# Patient Record
Sex: FEMALE | Race: WHITE | NOT HISPANIC OR LATINO | Employment: OTHER | URBAN - METROPOLITAN AREA
[De-identification: names, ages, dates, MRNs, and addresses within clinical notes are randomized per-mention and may not be internally consistent; named-entity substitution may affect disease eponyms.]

---

## 2023-06-20 RX ORDER — ATORVASTATIN CALCIUM 20 MG/1
20 TABLET, FILM COATED ORAL DAILY
COMMUNITY
Start: 2020-11-18 | End: 2023-06-23 | Stop reason: SDUPTHER

## 2023-06-20 RX ORDER — HYDROCHLOROTHIAZIDE 25 MG/1
25 TABLET ORAL DAILY
COMMUNITY
Start: 2022-06-17 | End: 2023-06-23 | Stop reason: SDUPTHER

## 2023-06-20 RX ORDER — ENALAPRIL MALEATE 20 MG/1
20 TABLET ORAL DAILY
COMMUNITY
Start: 2022-06-17 | End: 2023-06-23 | Stop reason: SDUPTHER

## 2023-06-20 RX ORDER — CLOTRIMAZOLE AND BETAMETHASONE DIPROPIONATE 10; .64 MG/G; MG/G
1 CREAM TOPICAL 2 TIMES DAILY
COMMUNITY
Start: 2022-07-06

## 2023-06-22 PROBLEM — D12.6 TUBULAR ADENOMA OF COLON: Status: ACTIVE | Noted: 2023-06-22

## 2023-06-22 PROBLEM — K11.20 SIALADENITIS: Status: ACTIVE | Noted: 2023-06-22

## 2023-06-22 PROBLEM — N63.0 BREAST LUMP: Status: ACTIVE | Noted: 2023-06-22

## 2023-06-22 PROBLEM — N90.9 IRRITATION OF EXTERNAL FEMALE GENITALIA: Status: ACTIVE | Noted: 2023-06-22

## 2023-06-22 PROBLEM — K52.9 COLITIS: Status: ACTIVE | Noted: 2023-06-22

## 2023-06-22 PROBLEM — K63.2 COLONIC FISTULA: Status: ACTIVE | Noted: 2023-06-22

## 2023-06-22 PROBLEM — L20.9 ATOPIC DERMATITIS: Status: ACTIVE | Noted: 2023-06-22

## 2023-06-22 PROBLEM — K11.5 SIALOLITH: Status: ACTIVE | Noted: 2023-06-22

## 2023-06-22 PROBLEM — R13.10 DYSPHAGIA: Status: ACTIVE | Noted: 2023-06-22

## 2023-06-22 PROBLEM — R19.5 POSITIVE COLORECTAL CANCER SCREENING USING COLOGUARD TEST: Status: ACTIVE | Noted: 2023-06-22

## 2023-06-22 PROBLEM — N39.0 ACUTE LOWER UTI (URINARY TRACT INFECTION): Status: RESOLVED | Noted: 2023-06-22 | Resolved: 2023-06-22

## 2023-06-22 PROBLEM — D12.6 ADENOMATOUS COLON POLYP: Status: ACTIVE | Noted: 2023-06-22

## 2023-06-22 PROBLEM — I10 HYPERTENSION: Status: ACTIVE | Noted: 2023-06-22

## 2023-06-22 PROBLEM — E04.1 THYROID NODULE: Status: ACTIVE | Noted: 2023-06-22

## 2023-06-22 PROBLEM — R31.9 HEMATURIA: Status: ACTIVE | Noted: 2023-06-22

## 2023-06-22 PROBLEM — D36.7 BENIGN NEOPLASM OF NECK: Status: ACTIVE | Noted: 2023-06-22

## 2023-06-22 PROBLEM — N39.3 STRESS INCONTINENCE, FEMALE: Status: ACTIVE | Noted: 2023-06-22

## 2023-06-22 PROBLEM — R59.0 CERVICAL LYMPHADENOPATHY: Status: ACTIVE | Noted: 2023-06-22

## 2023-06-22 PROBLEM — R35.0 URINARY FREQUENCY: Status: ACTIVE | Noted: 2023-06-22

## 2023-06-22 PROBLEM — D10.0: Status: ACTIVE | Noted: 2023-06-22

## 2023-06-22 PROBLEM — N32.1 COLOVESICAL FISTULA: Status: ACTIVE | Noted: 2023-06-22

## 2023-06-22 PROBLEM — H40.9 GLAUCOMA: Status: ACTIVE | Noted: 2023-06-22

## 2023-06-22 PROBLEM — R92.8 ABNORMAL MAMMOGRAM: Status: ACTIVE | Noted: 2023-06-22

## 2023-06-22 PROBLEM — R31.0 GROSS HEMATURIA: Status: ACTIVE | Noted: 2023-06-22

## 2023-06-22 PROBLEM — R22.1 NECK MASS: Status: ACTIVE | Noted: 2023-06-22

## 2023-06-22 PROBLEM — E66.01 OBESITY, MORBID (MULTI): Status: ACTIVE | Noted: 2023-06-22

## 2023-06-22 PROBLEM — E78.5 HLD (HYPERLIPIDEMIA): Status: ACTIVE | Noted: 2023-06-22

## 2023-06-22 PROBLEM — L30.9 ECZEMA: Status: ACTIVE | Noted: 2023-06-22

## 2023-06-22 PROBLEM — D72.829 LEUCOCYTOSIS: Status: ACTIVE | Noted: 2023-06-22

## 2023-06-22 PROBLEM — N28.89 RENAL MASS: Status: ACTIVE | Noted: 2023-06-22

## 2023-06-23 ENCOUNTER — OFFICE VISIT (OUTPATIENT)
Dept: PRIMARY CARE | Facility: CLINIC | Age: 68
End: 2023-06-23
Payer: MEDICARE

## 2023-06-23 VITALS — WEIGHT: 232 LBS | BODY MASS INDEX: 48.49 KG/M2 | DIASTOLIC BLOOD PRESSURE: 74 MMHG | SYSTOLIC BLOOD PRESSURE: 124 MMHG

## 2023-06-23 DIAGNOSIS — E66.01 OBESITY, MORBID (MULTI): ICD-10-CM

## 2023-06-23 DIAGNOSIS — I10 PRIMARY HYPERTENSION: ICD-10-CM

## 2023-06-23 DIAGNOSIS — E78.2 MIXED HYPERLIPIDEMIA: Primary | ICD-10-CM

## 2023-06-23 LAB
POC HDL CHOLESTEROL: 28 MG/DL (ref 0–50)
POC LDL CHOLESTEROL: 117 MG/DL (ref 0–100)
POC NON-HDL CHOLESTEROL: 143 MG/DL (ref 0–130)
POC TOTAL CHOLESTEROL/HDL RATIO: 6.1 (ref 0–4.5)
POC TOTAL CHOLESTEROL: 171 MG/DL (ref 0–199)
POC TRIGLYCERIDES: 128 MG/DL (ref 0–150)

## 2023-06-23 PROCEDURE — 3074F SYST BP LT 130 MM HG: CPT | Performed by: FAMILY MEDICINE

## 2023-06-23 PROCEDURE — 1036F TOBACCO NON-USER: CPT | Performed by: FAMILY MEDICINE

## 2023-06-23 PROCEDURE — 3008F BODY MASS INDEX DOCD: CPT | Performed by: FAMILY MEDICINE

## 2023-06-23 PROCEDURE — 1159F MED LIST DOCD IN RCRD: CPT | Performed by: FAMILY MEDICINE

## 2023-06-23 PROCEDURE — 1160F RVW MEDS BY RX/DR IN RCRD: CPT | Performed by: FAMILY MEDICINE

## 2023-06-23 PROCEDURE — 3078F DIAST BP <80 MM HG: CPT | Performed by: FAMILY MEDICINE

## 2023-06-23 PROCEDURE — 80061 LIPID PANEL: CPT | Performed by: FAMILY MEDICINE

## 2023-06-23 PROCEDURE — 99214 OFFICE O/P EST MOD 30 MIN: CPT | Performed by: FAMILY MEDICINE

## 2023-06-23 RX ORDER — ENALAPRIL MALEATE 20 MG/1
20 TABLET ORAL DAILY
Qty: 90 TABLET | Refills: 3 | Status: SHIPPED | OUTPATIENT
Start: 2023-06-23 | End: 2023-12-19 | Stop reason: SDUPTHER

## 2023-06-23 RX ORDER — HYDROCHLOROTHIAZIDE 25 MG/1
25 TABLET ORAL DAILY
Qty: 90 TABLET | Refills: 3 | Status: SHIPPED | OUTPATIENT
Start: 2023-06-23 | End: 2023-12-19 | Stop reason: SDUPTHER

## 2023-06-23 RX ORDER — ATORVASTATIN CALCIUM 20 MG/1
20 TABLET, FILM COATED ORAL DAILY
Qty: 90 TABLET | Refills: 3 | Status: SHIPPED | OUTPATIENT
Start: 2023-06-23 | End: 2023-12-19 | Stop reason: SDUPTHER

## 2023-06-23 ASSESSMENT — PATIENT HEALTH QUESTIONNAIRE - PHQ9
SUM OF ALL RESPONSES TO PHQ9 QUESTIONS 1 AND 2: 0
1. LITTLE INTEREST OR PLEASURE IN DOING THINGS: NOT AT ALL
2. FEELING DOWN, DEPRESSED OR HOPELESS: NOT AT ALL

## 2023-06-24 NOTE — ASSESSMENT & PLAN NOTE
- at goal  - refilled hydrochlorothiazide 25 mg PO daily and Enalapril 20 mg PO daily   - follow up 6 mo

## 2023-10-05 ENCOUNTER — HOSPITAL ENCOUNTER (OUTPATIENT)
Dept: RADIOLOGY | Facility: HOSPITAL | Age: 68
Discharge: HOME | End: 2023-10-05
Payer: MEDICARE

## 2023-10-05 DIAGNOSIS — N28.89 OTHER SPECIFIED DISORDERS OF KIDNEY AND URETER: ICD-10-CM

## 2023-10-05 PROCEDURE — 74183 MRI ABD W/O CNTR FLWD CNTR: CPT | Performed by: STUDENT IN AN ORGANIZED HEALTH CARE EDUCATION/TRAINING PROGRAM

## 2023-10-05 PROCEDURE — 2550000001 HC RX 255 CONTRASTS: Performed by: UROLOGY

## 2023-10-05 PROCEDURE — 74183 MRI ABD W/O CNTR FLWD CNTR: CPT

## 2023-10-05 PROCEDURE — A9575 INJ GADOTERATE MEGLUMI 0.1ML: HCPCS | Performed by: UROLOGY

## 2023-10-05 RX ORDER — GADOTERATE MEGLUMINE 376.9 MG/ML
21 INJECTION INTRAVENOUS
Status: COMPLETED | OUTPATIENT
Start: 2023-10-05 | End: 2023-10-05

## 2023-10-05 RX ADMIN — GADOTERATE MEGLUMINE 21 ML: 376.9 INJECTION INTRAVENOUS at 10:06

## 2023-10-12 ENCOUNTER — OFFICE VISIT (OUTPATIENT)
Dept: UROLOGY | Facility: CLINIC | Age: 68
End: 2023-10-12
Payer: MEDICARE

## 2023-10-12 VITALS
DIASTOLIC BLOOD PRESSURE: 65 MMHG | SYSTOLIC BLOOD PRESSURE: 103 MMHG | WEIGHT: 237.22 LBS | BODY MASS INDEX: 54.9 KG/M2 | HEIGHT: 55 IN | HEART RATE: 80 BPM

## 2023-10-12 DIAGNOSIS — R31.0 GROSS HEMATURIA: ICD-10-CM

## 2023-10-12 DIAGNOSIS — N28.1 ACQUIRED RENAL CYST OF RIGHT KIDNEY: Primary | ICD-10-CM

## 2023-10-12 PROBLEM — N28.89 RENAL MASS: Status: RESOLVED | Noted: 2023-06-22 | Resolved: 2023-10-12

## 2023-10-12 PROBLEM — N32.1 COLOVESICAL FISTULA: Status: RESOLVED | Noted: 2023-06-22 | Resolved: 2023-10-12

## 2023-10-12 PROBLEM — R31.9 HEMATURIA: Status: RESOLVED | Noted: 2023-06-22 | Resolved: 2023-10-12

## 2023-10-12 PROCEDURE — 1126F AMNT PAIN NOTED NONE PRSNT: CPT | Performed by: UROLOGY

## 2023-10-12 PROCEDURE — 3008F BODY MASS INDEX DOCD: CPT | Performed by: UROLOGY

## 2023-10-12 PROCEDURE — 99213 OFFICE O/P EST LOW 20 MIN: CPT | Performed by: UROLOGY

## 2023-10-12 PROCEDURE — 1159F MED LIST DOCD IN RCRD: CPT | Performed by: UROLOGY

## 2023-10-12 PROCEDURE — 1036F TOBACCO NON-USER: CPT | Performed by: UROLOGY

## 2023-10-12 PROCEDURE — 1160F RVW MEDS BY RX/DR IN RCRD: CPT | Performed by: UROLOGY

## 2023-10-12 PROCEDURE — 3078F DIAST BP <80 MM HG: CPT | Performed by: UROLOGY

## 2023-10-12 PROCEDURE — 3074F SYST BP LT 130 MM HG: CPT | Performed by: UROLOGY

## 2023-10-12 ASSESSMENT — PAIN SCALES - GENERAL: PAINLEVEL: 0-NO PAIN

## 2023-10-12 NOTE — PROGRESS NOTES
PRIOR NOTES  67-year-old female seeing me for hematuria  PMH: BMI 45, hyperlipidemia, hypertension, stress incontinence  Patient had episode of gross hematuria in July. Initially treated with antibiotics with resolution of symptoms, however urgency and hematuria returned after completion of antibiotics.  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 Small approximately 1.2 cm exophytic mass on the right posterior medial aspect of the kidney. Just below the level of the hilum. Hounsfield units 56 on noncontrast, enhances to 97. Appears to be a fistula from the sigmoid to the dome of the bladder. There is also small bowel sitting immediately adjacent to the dome of the bladder into the sigmoid fistula.  Cystoscopy with several patches of erythema and a small fistula located at the dome of the bladder in the midline  Plan - referral to Dr. Rudolph for CVF. AS of renal mass.     11/7/22 - open sigmoidectomy, CRA    FUV 2/16/23 - Pt reports bladder symptoms have improved. Still has NTF 1-3x. Depends on fluid intake. Drinks sig amt of fluid at night. Minimal daytime symptoms. No hematuria.   MRI enterography 10/2022 - renal cysts, no mass    MRI 10/5/23 - R midpole 1.1cm stable since 2011 suspecting hyperdense cyst      UPDATED SUBJECTIVE HISTORY  10/12/23 -patient with doing well, urinary symptoms have improved.  She is still having some diarrhea.  No flank pain or hematuria.    Past Medical History  She has a past medical history of Acute lower UTI (urinary tract infection) (06/22/2023), Crohn's disease of large intestine without complications (CMS/HCC) (12/20/2017), Cutaneous abscess, unspecified (10/08/2019), Encounter for screening for malignant  neoplasm of colon (06/17/2022), Fistula of intestine (09/14/2022), Other conditions influencing health status (08/18/2022), Other fecal abnormalities (09/07/2022), Other hypertrophic disorders of the skin (07/31/2019), Personal history of colonic polyps (09/17/2022), Personal history of other benign neoplasm (12/20/2017), Personal history of other diseases of the digestive system (09/21/2022), Personal history of other endocrine, nutritional and metabolic disease (09/03/2020), Sialoadenitis, unspecified (09/03/2020), 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    Surgical History  She has a past surgical history that includes Hysterectomy (03/19/2014); Hernia repair (03/19/2014); Other surgical history (11/29/2022); and Colonoscopy (02/23/2018).     Social History  She reports that she quit smoking about 21 months ago. Her smoking use included cigarettes. She has never been exposed to tobacco smoke. She has never used smokeless tobacco. She reports that she does not currently use alcohol. She reports that she does not use drugs.    Family History  Family History   Problem Relation Name Age of Onset    Hypertension Mother      Arthritis Mother      Other (CVA) Mother      COPD Father      Other (congestive cardiomyopathy) Father      Other (lung mass) Father          Allergies  Bee pollen and House dust    ROS: 12 system review was completed and is negative with the exception of those signs and symptoms noted in the history of present illness: A 12 system review was completed and is negative with the exception of those signs and symptoms noted in the history of present illness.     Exam:  General: in NAD, appears stated age  Head: normocephalic, atraumatic  Respiratory: normal effort, no use of accessory muscles  Cardiovascular: no edema noted  Skin: normal turgor, no rashes  Neurologic: grossly intact, oriented to person/place/time  Psychiatric: mode and affect appropriate    "  Last Recorded Vitals  Blood pressure 103/65, pulse 80, height 1.346 m (4' 5\"), weight 108 kg (237 lb 3.4 oz).    Lab Results   Component Value Date    CREATININE 0.66 11/12/2022    HGB 12.2 11/12/2022         ASSESSMENT/PLAN:  #Right renal mass  -Given the overall stability since 2011 and imaging characteristics, this is a benign Bosniak 2 cyst  -No further imaging required  -Reassurance provided  -Follow-up as needed    Yaya Huerta MD    "

## 2023-10-19 ENCOUNTER — APPOINTMENT (OUTPATIENT)
Dept: UROLOGY | Facility: CLINIC | Age: 68
End: 2023-10-19
Payer: MEDICARE

## 2023-12-15 PROBLEM — K57.32 DIVERTICULITIS OF COLON: Status: ACTIVE | Noted: 2023-12-15

## 2023-12-15 PROBLEM — R35.1 NOCTURIA: Status: ACTIVE | Noted: 2023-12-15

## 2023-12-19 ENCOUNTER — OFFICE VISIT (OUTPATIENT)
Dept: PRIMARY CARE | Facility: CLINIC | Age: 68
End: 2023-12-19
Payer: MEDICARE

## 2023-12-19 VITALS
TEMPERATURE: 96.9 F | DIASTOLIC BLOOD PRESSURE: 70 MMHG | BODY MASS INDEX: 54.62 KG/M2 | WEIGHT: 236 LBS | HEIGHT: 55 IN | SYSTOLIC BLOOD PRESSURE: 122 MMHG

## 2023-12-19 DIAGNOSIS — E78.2 MIXED HYPERLIPIDEMIA: ICD-10-CM

## 2023-12-19 DIAGNOSIS — E66.01 OBESITY, MORBID (MULTI): ICD-10-CM

## 2023-12-19 DIAGNOSIS — Z00.00 ROUTINE GENERAL MEDICAL EXAMINATION AT HEALTH CARE FACILITY: Primary | ICD-10-CM

## 2023-12-19 DIAGNOSIS — I10 PRIMARY HYPERTENSION: ICD-10-CM

## 2023-12-19 LAB
ALBUMIN SERPL BCP-MCNC: 4 G/DL (ref 3.4–5)
ALP SERPL-CCNC: 100 U/L (ref 33–136)
ALT SERPL W P-5'-P-CCNC: 17 U/L (ref 7–45)
ANION GAP SERPL CALC-SCNC: 11 MMOL/L (ref 10–20)
AST SERPL W P-5'-P-CCNC: 14 U/L (ref 9–39)
BILIRUB SERPL-MCNC: 0.6 MG/DL (ref 0–1.2)
BUN SERPL-MCNC: 21 MG/DL (ref 6–23)
CALCIUM SERPL-MCNC: 9.7 MG/DL (ref 8.6–10.3)
CHLORIDE SERPL-SCNC: 101 MMOL/L (ref 98–107)
CHOLEST SERPL-MCNC: 180 MG/DL (ref 0–199)
CHOLESTEROL/HDL RATIO: 4.5
CO2 SERPL-SCNC: 30 MMOL/L (ref 21–32)
CREAT SERPL-MCNC: 0.87 MG/DL (ref 0.5–1.05)
ERYTHROCYTE [DISTWIDTH] IN BLOOD BY AUTOMATED COUNT: 13.7 % (ref 11.5–14.5)
GFR SERPL CREATININE-BSD FRML MDRD: 72 ML/MIN/1.73M*2
GLUCOSE SERPL-MCNC: 109 MG/DL (ref 74–99)
HCT VFR BLD AUTO: 46.7 % (ref 36–46)
HDLC SERPL-MCNC: 40.3 MG/DL
HGB BLD-MCNC: 14.8 G/DL (ref 12–16)
LDLC SERPL CALC-MCNC: 112 MG/DL
MCH RBC QN AUTO: 28.8 PG (ref 26–34)
MCHC RBC AUTO-ENTMCNC: 31.7 G/DL (ref 32–36)
MCV RBC AUTO: 91 FL (ref 80–100)
NON HDL CHOLESTEROL: 140 MG/DL (ref 0–149)
NRBC BLD-RTO: 0 /100 WBCS (ref 0–0)
PLATELET # BLD AUTO: 360 X10*3/UL (ref 150–450)
POTASSIUM SERPL-SCNC: 4.3 MMOL/L (ref 3.5–5.3)
PROT SERPL-MCNC: 7.3 G/DL (ref 6.4–8.2)
RBC # BLD AUTO: 5.14 X10*6/UL (ref 4–5.2)
SODIUM SERPL-SCNC: 138 MMOL/L (ref 136–145)
TRIGL SERPL-MCNC: 138 MG/DL (ref 0–149)
VLDL: 28 MG/DL (ref 0–40)
WBC # BLD AUTO: 11.4 X10*3/UL (ref 4.4–11.3)

## 2023-12-19 PROCEDURE — 1170F FXNL STATUS ASSESSED: CPT | Performed by: FAMILY MEDICINE

## 2023-12-19 PROCEDURE — 1160F RVW MEDS BY RX/DR IN RCRD: CPT | Performed by: FAMILY MEDICINE

## 2023-12-19 PROCEDURE — 3008F BODY MASS INDEX DOCD: CPT | Performed by: FAMILY MEDICINE

## 2023-12-19 PROCEDURE — 1159F MED LIST DOCD IN RCRD: CPT | Performed by: FAMILY MEDICINE

## 2023-12-19 PROCEDURE — 80061 LIPID PANEL: CPT

## 2023-12-19 PROCEDURE — 85027 COMPLETE CBC AUTOMATED: CPT

## 2023-12-19 PROCEDURE — 1126F AMNT PAIN NOTED NONE PRSNT: CPT | Performed by: FAMILY MEDICINE

## 2023-12-19 PROCEDURE — 3074F SYST BP LT 130 MM HG: CPT | Performed by: FAMILY MEDICINE

## 2023-12-19 PROCEDURE — 36415 COLL VENOUS BLD VENIPUNCTURE: CPT

## 2023-12-19 PROCEDURE — 1036F TOBACCO NON-USER: CPT | Performed by: FAMILY MEDICINE

## 2023-12-19 PROCEDURE — 80053 COMPREHEN METABOLIC PANEL: CPT

## 2023-12-19 PROCEDURE — G0439 PPPS, SUBSEQ VISIT: HCPCS | Performed by: FAMILY MEDICINE

## 2023-12-19 PROCEDURE — 3078F DIAST BP <80 MM HG: CPT | Performed by: FAMILY MEDICINE

## 2023-12-19 RX ORDER — ENALAPRIL MALEATE 20 MG/1
20 TABLET ORAL DAILY
Qty: 90 TABLET | Refills: 3 | Status: SHIPPED | OUTPATIENT
Start: 2023-12-19

## 2023-12-19 RX ORDER — HYDROCHLOROTHIAZIDE 25 MG/1
25 TABLET ORAL DAILY
Qty: 90 TABLET | Refills: 3 | Status: SHIPPED | OUTPATIENT
Start: 2023-12-19

## 2023-12-19 RX ORDER — ATORVASTATIN CALCIUM 20 MG/1
20 TABLET, FILM COATED ORAL DAILY
Qty: 90 TABLET | Refills: 3 | Status: SHIPPED | OUTPATIENT
Start: 2023-12-19

## 2023-12-19 ASSESSMENT — ACTIVITIES OF DAILY LIVING (ADL)
DRESSING: INDEPENDENT
BATHING: INDEPENDENT
MANAGING_FINANCES: INDEPENDENT
GROCERY_SHOPPING: INDEPENDENT
TAKING_MEDICATION: INDEPENDENT
DOING_HOUSEWORK: INDEPENDENT

## 2023-12-19 ASSESSMENT — PATIENT HEALTH QUESTIONNAIRE - PHQ9
1. LITTLE INTEREST OR PLEASURE IN DOING THINGS: NOT AT ALL
SUM OF ALL RESPONSES TO PHQ9 QUESTIONS 1 AND 2: 0
2. FEELING DOWN, DEPRESSED OR HOPELESS: NOT AT ALL

## 2023-12-19 ASSESSMENT — ENCOUNTER SYMPTOMS
ABDOMINAL PAIN: 0
SHORTNESS OF BREATH: 0
COUGH: 0

## 2023-12-19 NOTE — PATIENT INSTRUCTIONS
Please look into coverage for Tetanus booster (TD or Tdap)  Please look in to coverage for Shingrix (2 shot series)

## 2023-12-19 NOTE — ASSESSMENT & PLAN NOTE
- Last mammogram 8/2022  - Last colonoscopy 9/2022, new GI referral placed  - Bone density testing 8/2022, normal   - Pneumococcal series completed after age 65  - Patient can not tolerate Influenza vaccination  - Patient to look into coverage for Shingrix and Tetanus vaccine   - Quit smoking completely Mothers day 2022

## 2023-12-19 NOTE — PROGRESS NOTES
"Subjective   Reason for Visit: Catherine Lemus is an 68 y.o. female here for a Medicare Wellness visit.     Past Medical, Surgical, and Family History reviewed and updated in chart.    Reviewed all medications by prescribing practitioner or clinical pharmacist (such as prescriptions, OTCs, herbal therapies and supplements) and documented in the medical record.    Patient without concerns today        Patient Care Team:  Mulu Augustine DO as PCP - General  Mulu Augustine DO as PCP - [facility]ributed [facility]     Review of Systems   Respiratory:  Negative for cough and shortness of breath.    Cardiovascular:  Negative for chest pain.   Gastrointestinal:  Negative for abdominal pain.       Objective   Vitals:  /70   Temp 36.1 °C (96.9 °F)   Ht 1.346 m (4' 5\")   Wt 107 kg (236 lb)   BMI 59.07 kg/m²       Physical Exam  Constitutional:       Appearance: Normal appearance.   HENT:      Head: Normocephalic and atraumatic.   Cardiovascular:      Rate and Rhythm: Normal rate and regular rhythm.      Pulses: Normal pulses.      Heart sounds: Normal heart sounds.   Pulmonary:      Effort: Pulmonary effort is normal.      Breath sounds: Normal breath sounds.   Abdominal:      General: Abdomen is flat. Bowel sounds are normal.      Palpations: Abdomen is soft.   Skin:     General: Skin is warm and dry.   Neurological:      General: No focal deficit present.      Mental Status: She is alert.   Psychiatric:         Mood and Affect: Mood normal.         Assessment/Plan   Problem List Items Addressed This Visit       HLD (hyperlipidemia)    Current Assessment & Plan     - refilled Atorvastatin 20 mg PO daily         Relevant Medications    atorvastatin (Lipitor) 20 mg tablet    Other Relevant Orders    Lipid Panel    Hypertension    Current Assessment & Plan     - at goal  - refilled hydrochlorothiazide 25 mg PO daily and Enalapril 20 mg PO daily   - follow up 6 mo         Relevant Medications    enalapril (Vasotec) " 20 mg tablet    hydroCHLOROthiazide (HYDRODiuril) 25 mg tablet    Other Relevant Orders    CBC    Comprehensive Metabolic Panel    Lipid Panel    Obesity, morbid (CMS/HCC)    Current Assessment & Plan     - BMI 59  - continue efforts at weight loss through healthy diet and exercise habits         Routine general medical examination at health care facility - Primary    Current Assessment & Plan     - Last mammogram 8/2022  - Last colonoscopy 9/2022, new GI referral placed  - Bone density testing 8/2022, normal   - Pneumococcal series completed after age 65  - Patient can not tolerate Influenza vaccination  - Patient to look into coverage for Shingrix and Tetanus vaccine   - Quit smoking completely Mothers day 2022         Relevant Orders    1 Year Follow Up In Primary Care - Wellness Exam

## 2024-01-01 NOTE — PROGRESS NOTES
Chief complaint:   Chief Complaint   Patient presents with    Follow-up     6 month follow up       HPI:  Catherine Lemus is a 67 y.o. female who presents for evaluation and follow up.     Physical exam:  /74   Wt 105 kg (232 lb)   BMI 48.49 kg/m²   General: NAD, well appearing female  Heart: RRR, no mumur appreciated  Lungs: CTAB, no wheezes, rales, rhonchi  Abdomen: soft, non tender, normoactive BS, no organomegaly  Extremities: No LE edema    Assessment/Plan   Problem List Items Addressed This Visit       HLD (hyperlipidemia) - Primary     - IO lipid reviewed  - refilled Atorvastatin 20 mg PO daily         Relevant Medications    atorvastatin (Lipitor) 20 mg tablet    Other Relevant Orders    POCT Lipid Panel manually resulted (Completed)    Hypertension     - at goal  - refilled hydrochlorothiazide 25 mg PO daily and Enalapril 20 mg PO daily   - follow up 6 mo         Relevant Medications    enalapril (Vasotec) 20 mg tablet    hydroCHLOROthiazide (HYDRODiuril) 25 mg tablet    Obesity, morbid (CMS/HCC)     - BMI 46  - continue efforts at weight loss through healthy diet and exercise habits          Other Visit Diagnoses       BMI 45.0-49.9, adult (CMS/HCC)              Last mammogram 8/2022, elects for every 2 year screening  Last colonoscopy 9/2022  Bone density testing 8/2022, normal     Patient can not tolerate Influenza vaccination  Patient to look into coverage for Shingrix and Tetanus vaccine     Quit smoking completely Mothers day 2022    Mulu Augustine, DO        6

## 2024-06-21 ENCOUNTER — APPOINTMENT (OUTPATIENT)
Dept: PRIMARY CARE | Facility: CLINIC | Age: 69
End: 2024-06-21
Payer: MEDICARE

## 2024-06-21 VITALS
BODY MASS INDEX: 58.32 KG/M2 | TEMPERATURE: 96.4 F | DIASTOLIC BLOOD PRESSURE: 70 MMHG | SYSTOLIC BLOOD PRESSURE: 120 MMHG | WEIGHT: 233 LBS

## 2024-06-21 DIAGNOSIS — Z78.0 POSTMENOPAUSAL: ICD-10-CM

## 2024-06-21 DIAGNOSIS — Z12.31 ENCOUNTER FOR SCREENING MAMMOGRAM FOR MALIGNANT NEOPLASM OF BREAST: ICD-10-CM

## 2024-06-21 DIAGNOSIS — R19.8 IRREGULAR BOWEL HABITS: Primary | ICD-10-CM

## 2024-06-21 DIAGNOSIS — R73.9 HYPERGLYCEMIA: ICD-10-CM

## 2024-06-21 DIAGNOSIS — R79.89 ABNORMAL CBC: ICD-10-CM

## 2024-06-21 LAB
ALBUMIN SERPL BCP-MCNC: 4 G/DL (ref 3.4–5)
ALP SERPL-CCNC: 91 U/L (ref 33–136)
ALT SERPL W P-5'-P-CCNC: 17 U/L (ref 7–45)
ANION GAP SERPL CALC-SCNC: 15 MMOL/L (ref 10–20)
AST SERPL W P-5'-P-CCNC: 17 U/L (ref 9–39)
BASOPHILS # BLD AUTO: 0.09 X10*3/UL (ref 0–0.1)
BASOPHILS NFR BLD AUTO: 0.8 %
BILIRUB SERPL-MCNC: 0.5 MG/DL (ref 0–1.2)
BUN SERPL-MCNC: 23 MG/DL (ref 6–23)
CALCIUM SERPL-MCNC: 9.5 MG/DL (ref 8.6–10.3)
CHLORIDE SERPL-SCNC: 101 MMOL/L (ref 98–107)
CO2 SERPL-SCNC: 28 MMOL/L (ref 21–32)
CREAT SERPL-MCNC: 1.03 MG/DL (ref 0.5–1.05)
EGFRCR SERPLBLD CKD-EPI 2021: 59 ML/MIN/1.73M*2
EOSINOPHIL # BLD AUTO: 0.62 X10*3/UL (ref 0–0.7)
EOSINOPHIL NFR BLD AUTO: 5.3 %
ERYTHROCYTE [DISTWIDTH] IN BLOOD BY AUTOMATED COUNT: 14.2 % (ref 11.5–14.5)
GLUCOSE SERPL-MCNC: 107 MG/DL (ref 74–99)
HCT VFR BLD AUTO: 48.2 % (ref 36–46)
HGB BLD-MCNC: 15 G/DL (ref 12–16)
IMM GRANULOCYTES # BLD AUTO: 0.13 X10*3/UL (ref 0–0.7)
IMM GRANULOCYTES NFR BLD AUTO: 1.1 % (ref 0–0.9)
LYMPHOCYTES # BLD AUTO: 1.85 X10*3/UL (ref 1.2–4.8)
LYMPHOCYTES NFR BLD AUTO: 15.9 %
MCH RBC QN AUTO: 28.6 PG (ref 26–34)
MCHC RBC AUTO-ENTMCNC: 31.1 G/DL (ref 32–36)
MCV RBC AUTO: 92 FL (ref 80–100)
MONOCYTES # BLD AUTO: 0.88 X10*3/UL (ref 0.1–1)
MONOCYTES NFR BLD AUTO: 7.6 %
NEUTROPHILS # BLD AUTO: 8.08 X10*3/UL (ref 1.2–7.7)
NEUTROPHILS NFR BLD AUTO: 69.3 %
NRBC BLD-RTO: 0 /100 WBCS (ref 0–0)
PLATELET # BLD AUTO: 333 X10*3/UL (ref 150–450)
POTASSIUM SERPL-SCNC: 4.9 MMOL/L (ref 3.5–5.3)
PROT SERPL-MCNC: 7.4 G/DL (ref 6.4–8.2)
RBC # BLD AUTO: 5.24 X10*6/UL (ref 4–5.2)
SODIUM SERPL-SCNC: 139 MMOL/L (ref 136–145)
WBC # BLD AUTO: 11.7 X10*3/UL (ref 4.4–11.3)

## 2024-06-21 PROCEDURE — 1160F RVW MEDS BY RX/DR IN RCRD: CPT | Performed by: FAMILY MEDICINE

## 2024-06-21 PROCEDURE — 99214 OFFICE O/P EST MOD 30 MIN: CPT | Performed by: FAMILY MEDICINE

## 2024-06-21 PROCEDURE — 3074F SYST BP LT 130 MM HG: CPT | Performed by: FAMILY MEDICINE

## 2024-06-21 PROCEDURE — 1036F TOBACCO NON-USER: CPT | Performed by: FAMILY MEDICINE

## 2024-06-21 PROCEDURE — 85025 COMPLETE CBC W/AUTO DIFF WBC: CPT

## 2024-06-21 PROCEDURE — 1159F MED LIST DOCD IN RCRD: CPT | Performed by: FAMILY MEDICINE

## 2024-06-21 PROCEDURE — 36415 COLL VENOUS BLD VENIPUNCTURE: CPT

## 2024-06-21 PROCEDURE — 80053 COMPREHEN METABOLIC PANEL: CPT

## 2024-06-21 PROCEDURE — 3008F BODY MASS INDEX DOCD: CPT | Performed by: FAMILY MEDICINE

## 2024-06-21 PROCEDURE — 3078F DIAST BP <80 MM HG: CPT | Performed by: FAMILY MEDICINE

## 2024-06-21 ASSESSMENT — PATIENT HEALTH QUESTIONNAIRE - PHQ9
SUM OF ALL RESPONSES TO PHQ9 QUESTIONS 1 AND 2: 0
2. FEELING DOWN, DEPRESSED OR HOPELESS: NOT AT ALL
1. LITTLE INTEREST OR PLEASURE IN DOING THINGS: NOT AT ALL

## 2024-06-21 NOTE — PROGRESS NOTES
Chief complaint:   Chief Complaint   Patient presents with    Follow-up     6 month follow up       HPI:  Catherine Lemus is a 68 y.o. female who presents for evaluation and follow up. She has been struggling with her BM since her surgery. She is not currently taking anything for it.     Physical exam:  /70   Temp 35.8 °C (96.4 °F)   Wt 106 kg (233 lb)   BMI 58.32 kg/m²   General: NAD, well appearing female  Heart: RRR, no mumur appreciated  Lungs: CTAB, no wheezes, rales, rhonchi  Abdomen: soft, non tender, normoactive BS, no organomegaly  Extremities: No LE edema    Assessment/Plan   Problem List Items Addressed This Visit    None  Visit Diagnoses       Irregular bowel habits    -  Primary    Encounter for screening mammogram for malignant neoplasm of breast        Relevant Orders    BI mammo bilateral screening tomosynthesis    Postmenopausal        Relevant Orders    XR DEXA bone density    Abnormal CBC        Relevant Orders    CBC and Auto Differential    Hyperglycemia        Relevant Orders    Comprehensive Metabolic Panel        Last colonoscopy 9/6/2022: Due for colonoscopy 9/6/2025   Try metamucil for constipation  Smoking hx was intermittent over her life. She admits to smoking 1 ppd for less then 20 years in total  Labs ordered as rhodna  Mammogram and bone density testing were ordered.     Mulu Augustine DO

## 2024-06-22 DIAGNOSIS — R94.4 DECREASED GFR: Primary | ICD-10-CM

## 2024-06-28 ENCOUNTER — LAB (OUTPATIENT)
Dept: LAB | Facility: LAB | Age: 69
End: 2024-06-28
Payer: MEDICARE

## 2024-06-28 DIAGNOSIS — R94.4 DECREASED GFR: ICD-10-CM

## 2024-06-28 LAB
ANION GAP SERPL CALC-SCNC: 18 MMOL/L (ref 10–20)
BUN SERPL-MCNC: 21 MG/DL (ref 6–23)
CALCIUM SERPL-MCNC: 9.4 MG/DL (ref 8.6–10.6)
CHLORIDE SERPL-SCNC: 98 MMOL/L (ref 98–107)
CO2 SERPL-SCNC: 24 MMOL/L (ref 21–32)
CREAT SERPL-MCNC: 0.98 MG/DL (ref 0.5–1.05)
EGFRCR SERPLBLD CKD-EPI 2021: 63 ML/MIN/1.73M*2
GLUCOSE SERPL-MCNC: 75 MG/DL (ref 74–99)
POTASSIUM SERPL-SCNC: 4.9 MMOL/L (ref 3.5–5.3)
SODIUM SERPL-SCNC: 135 MMOL/L (ref 136–145)

## 2024-06-28 PROCEDURE — 36415 COLL VENOUS BLD VENIPUNCTURE: CPT

## 2024-06-28 PROCEDURE — 80048 BASIC METABOLIC PNL TOTAL CA: CPT

## 2024-06-29 DIAGNOSIS — E87.1 HYPONATREMIA: Primary | ICD-10-CM

## 2024-07-25 ENCOUNTER — HOSPITAL ENCOUNTER (OUTPATIENT)
Dept: RADIOLOGY | Facility: HOSPITAL | Age: 69
Discharge: HOME | End: 2024-07-25
Payer: MEDICARE

## 2024-07-25 VITALS — HEIGHT: 59 IN | BODY MASS INDEX: 46.37 KG/M2 | WEIGHT: 230 LBS

## 2024-07-25 DIAGNOSIS — Z78.0 POSTMENOPAUSAL: ICD-10-CM

## 2024-07-25 DIAGNOSIS — Z12.31 ENCOUNTER FOR SCREENING MAMMOGRAM FOR MALIGNANT NEOPLASM OF BREAST: ICD-10-CM

## 2024-07-25 PROCEDURE — 77080 DXA BONE DENSITY AXIAL: CPT

## 2024-07-25 PROCEDURE — 77067 SCR MAMMO BI INCL CAD: CPT

## 2024-07-25 ASSESSMENT — LIFESTYLE VARIABLES
3_OR_MORE_DRINKS_PER_DAY: N
CURRENT_SMOKER: N

## 2024-07-31 ENCOUNTER — PATIENT MESSAGE (OUTPATIENT)
Dept: PRIMARY CARE | Facility: CLINIC | Age: 69
End: 2024-07-31
Payer: MEDICARE

## 2024-07-31 DIAGNOSIS — E78.2 MIXED HYPERLIPIDEMIA: ICD-10-CM

## 2024-07-31 DIAGNOSIS — I10 PRIMARY HYPERTENSION: ICD-10-CM

## 2024-07-31 RX ORDER — ENALAPRIL MALEATE 20 MG/1
20 TABLET ORAL DAILY
Qty: 90 TABLET | Refills: 3 | Status: SHIPPED | OUTPATIENT
Start: 2024-07-31

## 2024-07-31 RX ORDER — ATORVASTATIN CALCIUM 20 MG/1
20 TABLET, FILM COATED ORAL DAILY
Qty: 90 TABLET | Refills: 3 | Status: SHIPPED | OUTPATIENT
Start: 2024-07-31

## 2024-07-31 RX ORDER — HYDROCHLOROTHIAZIDE 25 MG/1
25 TABLET ORAL DAILY
Qty: 90 TABLET | Refills: 3 | Status: SHIPPED | OUTPATIENT
Start: 2024-07-31

## 2024-08-16 ENCOUNTER — APPOINTMENT (OUTPATIENT)
Dept: OPHTHALMOLOGY | Facility: CLINIC | Age: 69
End: 2024-08-16
Payer: MEDICARE

## 2024-08-16 DIAGNOSIS — H40.039 ANATOMICAL NARROW ANGLE: ICD-10-CM

## 2024-08-16 DIAGNOSIS — H40.003 GLAUCOMA SUSPECT OF BOTH EYES: Primary | ICD-10-CM

## 2024-08-16 ASSESSMENT — ENCOUNTER SYMPTOMS
PSYCHIATRIC NEGATIVE: 0
ALLERGIC/IMMUNOLOGIC NEGATIVE: 0
EYES NEGATIVE: 1
NEUROLOGICAL NEGATIVE: 0
HEMATOLOGIC/LYMPHATIC NEGATIVE: 0
CONSTITUTIONAL NEGATIVE: 0
ENDOCRINE NEGATIVE: 0
MUSCULOSKELETAL NEGATIVE: 0
GASTROINTESTINAL NEGATIVE: 0
RESPIRATORY NEGATIVE: 0
CARDIOVASCULAR NEGATIVE: 0

## 2024-08-16 ASSESSMENT — CONF VISUAL FIELD
METHOD: COUNTING FINGERS
OD_NORMAL: 1
OD_INFERIOR_TEMPORAL_RESTRICTION: 0
OS_INFERIOR_NASAL_RESTRICTION: 0
OS_SUPERIOR_TEMPORAL_RESTRICTION: 0
OS_SUPERIOR_NASAL_RESTRICTION: 0
OD_SUPERIOR_NASAL_RESTRICTION: 0
OD_INFERIOR_NASAL_RESTRICTION: 0
OS_NORMAL: 1
OS_INFERIOR_TEMPORAL_RESTRICTION: 0
OD_SUPERIOR_TEMPORAL_RESTRICTION: 0

## 2024-08-16 ASSESSMENT — EXTERNAL EXAM - LEFT EYE: OS_EXAM: NORMAL

## 2024-08-16 ASSESSMENT — VISUAL ACUITY
OD_CC: 20/30-2
METHOD: SNELLEN - LINEAR
CORRECTION_TYPE: GLASSES
OS_CC: 20/30-1

## 2024-08-16 ASSESSMENT — PACHYMETRY
OD_CT(UM): 555
OS_CT(UM): 570

## 2024-08-16 ASSESSMENT — REFRACTION_WEARINGRX
OD_AXIS: 076
OS_CYLINDER: -1.25
OS_ADD: +3.00
OD_SPHERE: -3.75
OS_AXIS: 082
OD_ADD: +3.00
OS_SPHERE: -5.00
OD_CYLINDER: -1.00

## 2024-08-16 ASSESSMENT — SLIT LAMP EXAM - LIDS
COMMENTS: NORMAL
COMMENTS: NORMAL

## 2024-08-16 ASSESSMENT — TONOMETRY
OS_IOP_MMHG: 18
OD_IOP_MMHG: 18
IOP_METHOD: GOLDMANN APPLANATION

## 2024-08-16 ASSESSMENT — EXTERNAL EXAM - RIGHT EYE: OD_EXAM: NORMAL

## 2024-08-16 NOTE — PROGRESS NOTES
History    Chief Complaint    Glaucoma       HPI       Glaucoma    In both eyes.             Comments    68yoF here for glaucoma suspect eval OU, prev followed by Dr. Wilkes (with Dr. Acosta), patient has been off of glaucoma drops for 1 year, diagnosed at age 28, patient prev used Combigan and Cosopt but both caused burning and blurry vision OU, no family h/o glaucoma, no h/o eye surgery, c/o harder time reading street signs since MATHEUS, c/o harder time driving at night, no floaters, no flashes, no itchiness, no burning, no pain, no tearing, no discharge,           Last edited by JESSY Florentino on 8/16/2024  2:14 PM.        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) (vermilion border)    Breast lump    Cervical lymphadenopathy    Colitis    Colonic fistula    Dysphagia    Atopic dermatitis    Eczema    Glaucoma    Positive colorectal cancer screening using Cologuard test    HLD (hyperlipidemia)    Hypertension    Irritation of external female genitalia    Leucocytosis    Neck mass    Obesity, morbid (Multi)    Sialadenitis    Sialolith    Stress incontinence, female    Thyroid nodule    Tubular adenoma of colon    Urinary frequency    Nocturia    Diverticulitis of colon    Routine general medical examination at health care facility       Past Medical History:   Diagnosis Date    Acute lower UTI (urinary tract infection) 06/22/2023    Crohn's disease of large intestine without complications (Multi) 12/20/2017    Segmental colitis    Cutaneous abscess, unspecified 10/08/2019    Abscess    Encounter for screening for malignant neoplasm of colon 06/17/2022    Colon cancer screening    Fistula of intestine 09/14/2022    Colonic fistula    Glaucoma     Other conditions influencing health status 08/18/2022    Colonoscopy planned    Other fecal abnormalities 09/07/2022    Positive colorectal cancer  screening using Cologuard test    Other hypertrophic disorders of the skin 07/31/2019    Acquired skin tag    Personal history of colonic polyps 09/17/2022    History of adenomatous polyp of colon    Personal history of other benign neoplasm 12/20/2017    History of other benign neoplasm    Personal history of other diseases of the digestive system 09/21/2022    History of colitis    Personal history of other endocrine, nutritional and metabolic disease 09/03/2020    History of thyroid nodule    Renal mass 06/22/2023    Sialoadenitis, unspecified 09/03/2020    Sialadenitis    Vesicointestinal fistula 09/15/2022    Colovesical fistula    Vesicointestinal fistula 11/30/2022    Colovesical fistula     Past Surgical History:   Procedure Laterality Date    COLONOSCOPY  02/23/2018    Complete Colonoscopy    HERNIA REPAIR  03/19/2014    Hernia Repair    HYSTERECTOMY  03/19/2014    Hysterectomy    OTHER SURGICAL HISTORY  11/29/2022    Sigmoidectomy     SOCIAL HISTORY   SMOKING:  reports that she quit smoking about 2 years ago. Her smoking use included cigarettes. She has never been exposed to tobacco smoke. She has never used smokeless tobacco.  DRUG USE:    reports no history of drug use.    FAMILY HISTORY  family history includes Arthritis in her mother; COPD in her father; CVA in her mother; Hypertension in her mother; congestive cardiomyopathy in her father; lung mass in her father.    CURRENT MEDICATIONS  No current outpatient medications on file. (Ophthalmology pharm classes)       Current Outpatient Medications (Other)   Medication Sig Dispense Refill    atorvastatin (Lipitor) 20 mg tablet Take 1 tablet (20 mg) by mouth once daily. 90 tablet 3    clotrimazole-betamethasone (Lotrisone) cream Apply 1 Application topically 2 times a day.      enalapril (Vasotec) 20 mg tablet Take 1 tablet (20 mg) by mouth once daily. 90 tablet 3    hydroCHLOROthiazide (HYDRODiuril) 25 mg tablet Take 1 tablet (25 mg) by mouth once daily.  "90 tablet 3       Exam   Visual Acuity (Snellen - Linear)         Right Left    Dist cc 20/30-2 20/30-1    Dist ph cc NI NI      Correction: Glasses              Edited by: Leann Noel, COT              Wearing Rx       Wearing Rx         Sphere Cylinder Axis Add    Right -3.75 -1.00 076 +3.00    Left -5.00 -1.25 082 +3.00                  Not recorded       Pupils       Pupils         Pupils    Right PERRL, No APD    Left PERRL, No APD                  Intraocular pressure was 18 in the right eye and 18 in the left eye using Goldmann Applanation.  Extraocular Movement       Extraocular Movement         Right Left     Full, Ortho Full, Ortho                  Pachymetry       Pachymetry (8/16/2024)         Right Left    Thickness 555 570                  Gonioscopy       Gonioscopy         Right Left    Temporal a/b(c)20b 1-2+ a/b(c)20b 1-2+    Nasal a/b(c)20b 1-2+ a/b(c)20b 1-2+    Superior a/b(c)20b 1-2+ a/b(c)20b 1-2+    Inferior a/b(c)20b 1-2+ a/b(c)20b 1-2+                   External Exam         Right Left    External Normal Normal              Slit Lamp Exam         Right Left    Lids/Lashes Normal Normal    Conjunctiva/Sclera White and quiet White and quiet    Cornea Clear Clear    Anterior Chamber Deep and quiet Deep and quiet    Iris Round and reactive Round and reactive    Lens Clear Clear    Anterior Vitreous Normal Normal                   <div id=\"[email or web address]\"></div>       Diagnostics  Webb Visual Field - OU - Both Eyes          NSD OU          OCT, Optic Nerve - OU - Both Eyes          ROBUST ou BUT confounded by VPT OS artificially thick         OCT, Retina - OU - Both Eyes          Styphyloma OU  Retinal schesis OS             Plan   -  The encounter diagnosis was Glaucoma suspect of both eyes.  -  Referred By:  Ilana Ricci MD  -  IOP:  Last Tonometry OD 18 / OS 18 /Date 2:25 PM      Target OD: No Value exists for the : EPIC#TSO772 Target OS: No Value exists for the : " EPIC#[id number]       Max pressure OD:   Date:         Max Pressure OS:   Date:    -    Gonioscopy       Gonioscopy         Right Left    Temporal a/b(c)20b 1-2+ a/b(c)20b 1-2+    Nasal a/b(c)20b 1-2+ a/b(c)20b 1-2+    Superior a/b(c)20b 1-2+ a/b(c)20b 1-2+    Inferior a/b(c)20b 1-2+ a/b(c)20b 1-2+                    -    Pachymetry       Pachymetry (8/16/2024)         Right Left    Thickness 555 570                  -  Pathophysiology of glaucoma, and potential blinding nature of disease reviewed.      Importance of follow up and compliance stressed  -patient is a glaucoma suspect based on nerves  -not currently on therapy  -hx of brimonidine and dorzolamide intollerances   -IOP is normal  -no definitive evidenc eof glaucomatous optic neuroppathy  Ok to monitor off therapy  Of note patietn does have occludable angles  Ros for acute angle closure is negative, to present immediately if they occur  R/b/a to LPI reviewed with patient  She wishes to proceed, will schedule OD then OS then f/u

## 2024-09-09 ENCOUNTER — APPOINTMENT (OUTPATIENT)
Dept: OPHTHALMOLOGY | Facility: CLINIC | Age: 69
End: 2024-09-09
Payer: MEDICARE

## 2024-09-09 DIAGNOSIS — H40.039 ANATOMICAL NARROW ANGLE: Primary | ICD-10-CM

## 2024-09-09 PROCEDURE — 66761 REVISION OF IRIS: CPT | Mod: RIGHT SIDE | Performed by: OPHTHALMOLOGY

## 2024-09-09 RX ORDER — PREDNISOLONE ACETATE 10 MG/ML
SUSPENSION/ DROPS OPHTHALMIC
Qty: 5 ML | Refills: 0 | Status: SHIPPED | OUTPATIENT
Start: 2024-09-09

## 2024-09-09 ASSESSMENT — EXTERNAL EXAM - LEFT EYE: OS_EXAM: NORMAL

## 2024-09-09 ASSESSMENT — CONF VISUAL FIELD
OS_INFERIOR_TEMPORAL_RESTRICTION: 0
OD_INFERIOR_NASAL_RESTRICTION: 0
OD_SUPERIOR_TEMPORAL_RESTRICTION: 0
OS_NORMAL: 1
OS_SUPERIOR_TEMPORAL_RESTRICTION: 0
OS_SUPERIOR_NASAL_RESTRICTION: 0
OD_NORMAL: 1
OD_INFERIOR_TEMPORAL_RESTRICTION: 0
OD_SUPERIOR_NASAL_RESTRICTION: 0
OS_INFERIOR_NASAL_RESTRICTION: 0

## 2024-09-09 ASSESSMENT — ENCOUNTER SYMPTOMS
ENDOCRINE NEGATIVE: 0
CARDIOVASCULAR NEGATIVE: 0
RESPIRATORY NEGATIVE: 0
ALLERGIC/IMMUNOLOGIC NEGATIVE: 0
GASTROINTESTINAL NEGATIVE: 0
PSYCHIATRIC NEGATIVE: 0
EYES NEGATIVE: 1
NEUROLOGICAL NEGATIVE: 0
MUSCULOSKELETAL NEGATIVE: 0
CONSTITUTIONAL NEGATIVE: 0
HEMATOLOGIC/LYMPHATIC NEGATIVE: 0

## 2024-09-09 ASSESSMENT — SLIT LAMP EXAM - LIDS
COMMENTS: NORMAL
COMMENTS: NORMAL

## 2024-09-09 ASSESSMENT — TONOMETRY
OD_IOP_MMHG: 16
IOP_METHOD: GOLDMANN APPLANATION
IOP_METHOD: GOLDMANN APPLANATION

## 2024-09-09 ASSESSMENT — PACHYMETRY
OD_CT(UM): 555
OS_CT(UM): 570

## 2024-09-09 ASSESSMENT — EXTERNAL EXAM - RIGHT EYE: OD_EXAM: NORMAL

## 2024-09-09 ASSESSMENT — VISUAL ACUITY
CORRECTION_TYPE: GLASSES
OD_CC: 20/40
METHOD: SNELLEN - LINEAR
OD_PH_CC: 20/30
OD_CC+: +2
OD_PH_CC+: +2

## 2024-10-11 ENCOUNTER — APPOINTMENT (OUTPATIENT)
Dept: OPHTHALMOLOGY | Facility: CLINIC | Age: 69
End: 2024-10-11
Payer: MEDICARE

## 2024-10-11 DIAGNOSIS — H40.039 ANATOMICAL NARROW ANGLE: Primary | ICD-10-CM

## 2024-10-11 PROCEDURE — 66761 REVISION OF IRIS: CPT | Mod: LEFT SIDE | Performed by: OPHTHALMOLOGY

## 2024-10-11 ASSESSMENT — PACHYMETRY
OD_CT(UM): 555
OS_CT(UM): 570

## 2024-10-11 ASSESSMENT — TONOMETRY
IOP_METHOD: GOLDMANN APPLANATION
OD_IOP_MMHG: 21
OS_IOP_MMHG: 20
IOP_METHOD: GOLDMANN APPLANATION
OD_IOP_MMHG: 17

## 2024-10-11 ASSESSMENT — ENCOUNTER SYMPTOMS
GASTROINTESTINAL NEGATIVE: 0
NEUROLOGICAL NEGATIVE: 0
MUSCULOSKELETAL NEGATIVE: 0
RESPIRATORY NEGATIVE: 0
ENDOCRINE NEGATIVE: 0
HEMATOLOGIC/LYMPHATIC NEGATIVE: 0
PSYCHIATRIC NEGATIVE: 0
EYES NEGATIVE: 1
CARDIOVASCULAR NEGATIVE: 0
CONSTITUTIONAL NEGATIVE: 0
ALLERGIC/IMMUNOLOGIC NEGATIVE: 0

## 2024-10-11 ASSESSMENT — REFRACTION_WEARINGRX
OS_ADD: +3.00
OS_AXIS: 082
OD_ADD: +3.00
OS_CYLINDER: -1.25
OD_CYLINDER: -1.00
OD_AXIS: 076
OS_SPHERE: -5.00
OD_SPHERE: -3.75

## 2024-10-11 ASSESSMENT — SLIT LAMP EXAM - LIDS
COMMENTS: NORMAL
COMMENTS: NORMAL

## 2024-10-11 ASSESSMENT — VISUAL ACUITY
METHOD: SNELLEN - LINEAR
OD_CC: 20/30+2
OS_CC: 20/25
CORRECTION_TYPE: GLASSES

## 2024-10-11 ASSESSMENT — EXTERNAL EXAM - RIGHT EYE: OD_EXAM: NORMAL

## 2024-10-11 ASSESSMENT — EXTERNAL EXAM - LEFT EYE: OS_EXAM: NORMAL

## 2024-12-10 ENCOUNTER — APPOINTMENT (OUTPATIENT)
Dept: PRIMARY CARE | Facility: CLINIC | Age: 69
End: 2024-12-10
Payer: OTHER GOVERNMENT

## 2024-12-10 VITALS
BODY MASS INDEX: 44.55 KG/M2 | WEIGHT: 221 LBS | HEIGHT: 59 IN | TEMPERATURE: 97.3 F | DIASTOLIC BLOOD PRESSURE: 70 MMHG | SYSTOLIC BLOOD PRESSURE: 126 MMHG

## 2024-12-10 DIAGNOSIS — I10 PRIMARY HYPERTENSION: ICD-10-CM

## 2024-12-10 DIAGNOSIS — E66.01 MORBID OBESITY (MULTI): ICD-10-CM

## 2024-12-10 DIAGNOSIS — Z00.00 ROUTINE GENERAL MEDICAL EXAMINATION AT HEALTH CARE FACILITY: Primary | ICD-10-CM

## 2024-12-10 DIAGNOSIS — E78.2 MIXED HYPERLIPIDEMIA: ICD-10-CM

## 2024-12-10 LAB
ALBUMIN SERPL BCP-MCNC: 3.9 G/DL (ref 3.4–5)
ALP SERPL-CCNC: 107 U/L (ref 33–136)
ALT SERPL W P-5'-P-CCNC: 15 U/L (ref 7–45)
ANION GAP SERPL CALC-SCNC: 11 MMOL/L (ref 10–20)
AST SERPL W P-5'-P-CCNC: 14 U/L (ref 9–39)
BILIRUB SERPL-MCNC: 0.6 MG/DL (ref 0–1.2)
BUN SERPL-MCNC: 13 MG/DL (ref 6–23)
CALCIUM SERPL-MCNC: 9.7 MG/DL (ref 8.6–10.3)
CHLORIDE SERPL-SCNC: 102 MMOL/L (ref 98–107)
CHOLEST SERPL-MCNC: 157 MG/DL (ref 0–199)
CHOLESTEROL/HDL RATIO: 4
CO2 SERPL-SCNC: 32 MMOL/L (ref 21–32)
CREAT SERPL-MCNC: 0.84 MG/DL (ref 0.5–1.05)
EGFRCR SERPLBLD CKD-EPI 2021: 75 ML/MIN/1.73M*2
ERYTHROCYTE [DISTWIDTH] IN BLOOD BY AUTOMATED COUNT: 13.9 % (ref 11.5–14.5)
GLUCOSE SERPL-MCNC: 106 MG/DL (ref 74–99)
HCT VFR BLD AUTO: 49.2 % (ref 36–46)
HDLC SERPL-MCNC: 39.1 MG/DL
HGB BLD-MCNC: 15.5 G/DL (ref 12–16)
LDLC SERPL CALC-MCNC: 92 MG/DL
MCH RBC QN AUTO: 29.2 PG (ref 26–34)
MCHC RBC AUTO-ENTMCNC: 31.5 G/DL (ref 32–36)
MCV RBC AUTO: 93 FL (ref 80–100)
NON HDL CHOLESTEROL: 118 MG/DL (ref 0–149)
NRBC BLD-RTO: 0 /100 WBCS (ref 0–0)
PLATELET # BLD AUTO: 325 X10*3/UL (ref 150–450)
POTASSIUM SERPL-SCNC: 4.1 MMOL/L (ref 3.5–5.3)
PROT SERPL-MCNC: 7.4 G/DL (ref 6.4–8.2)
RBC # BLD AUTO: 5.3 X10*6/UL (ref 4–5.2)
SODIUM SERPL-SCNC: 141 MMOL/L (ref 136–145)
TRIGL SERPL-MCNC: 130 MG/DL (ref 0–149)
VLDL: 26 MG/DL (ref 0–40)
WBC # BLD AUTO: 11.6 X10*3/UL (ref 4.4–11.3)

## 2024-12-10 PROCEDURE — 3008F BODY MASS INDEX DOCD: CPT | Performed by: FAMILY MEDICINE

## 2024-12-10 PROCEDURE — 1159F MED LIST DOCD IN RCRD: CPT | Performed by: FAMILY MEDICINE

## 2024-12-10 PROCEDURE — 80061 LIPID PANEL: CPT

## 2024-12-10 PROCEDURE — 1170F FXNL STATUS ASSESSED: CPT | Performed by: FAMILY MEDICINE

## 2024-12-10 PROCEDURE — 3074F SYST BP LT 130 MM HG: CPT | Performed by: FAMILY MEDICINE

## 2024-12-10 PROCEDURE — 80053 COMPREHEN METABOLIC PANEL: CPT

## 2024-12-10 PROCEDURE — 1160F RVW MEDS BY RX/DR IN RCRD: CPT | Performed by: FAMILY MEDICINE

## 2024-12-10 PROCEDURE — 1123F ACP DISCUSS/DSCN MKR DOCD: CPT | Performed by: FAMILY MEDICINE

## 2024-12-10 PROCEDURE — 1036F TOBACCO NON-USER: CPT | Performed by: FAMILY MEDICINE

## 2024-12-10 PROCEDURE — 99213 OFFICE O/P EST LOW 20 MIN: CPT | Performed by: FAMILY MEDICINE

## 2024-12-10 PROCEDURE — G0439 PPPS, SUBSEQ VISIT: HCPCS | Performed by: FAMILY MEDICINE

## 2024-12-10 PROCEDURE — 85027 COMPLETE CBC AUTOMATED: CPT

## 2024-12-10 PROCEDURE — 3078F DIAST BP <80 MM HG: CPT | Performed by: FAMILY MEDICINE

## 2024-12-10 ASSESSMENT — PATIENT HEALTH QUESTIONNAIRE - PHQ9
2. FEELING DOWN, DEPRESSED OR HOPELESS: NOT AT ALL
SUM OF ALL RESPONSES TO PHQ9 QUESTIONS 1 AND 2: 0
1. LITTLE INTEREST OR PLEASURE IN DOING THINGS: NOT AT ALL

## 2024-12-10 ASSESSMENT — ACTIVITIES OF DAILY LIVING (ADL)
DOING_HOUSEWORK: INDEPENDENT
DRESSING: INDEPENDENT
MANAGING_FINANCES: INDEPENDENT
GROCERY_SHOPPING: INDEPENDENT
TAKING_MEDICATION: INDEPENDENT
BATHING: INDEPENDENT

## 2024-12-10 NOTE — PROGRESS NOTES
"Subjective   Reason for Visit: Catherine Lemus is an 69 y.o. female here for a Medicare Wellness visit.     Past Medical, Surgical, and Family History reviewed and updated in chart.         HPI    She states she had surgery 2 years ago and has occasional diarrhea wich can have incontinence. She states sometimes it is related to what she eats (solid beef or big salad). She then can not really go after for a day or 2. She feels like her abdomen is a little firmer prior to this happening.     Patient Care Team:  Mulu Augustine DO as PCP - General  Mulu Augustine DO as PCP - [facility]     Review of Systems   Constitutional:  Negative for activity change.   Respiratory:  Negative for shortness of breath.    Cardiovascular:  Negative for chest pain.   Gastrointestinal:  Positive for constipation and diarrhea.       Objective   Vitals:  /70 (BP Location: Left arm, Patient Position: Sitting)   Temp 36.3 °C (97.3 °F)   Ht 1.499 m (4' 11\")   Wt 100 kg (221 lb)   LMP  (LMP Unknown)   BMI 44.64 kg/m²       Physical Exam  Constitutional:       Appearance: Normal appearance. She is obese.   HENT:      Nose: Nose normal.   Eyes:      Conjunctiva/sclera: Conjunctivae normal.   Cardiovascular:      Rate and Rhythm: Normal rate and regular rhythm.   Pulmonary:      Effort: Pulmonary effort is normal.      Breath sounds: Normal breath sounds.   Abdominal:      General: Abdomen is flat.   Skin:     General: Skin is warm and dry.      Capillary Refill: Capillary refill takes less than 2 seconds.   Neurological:      Mental Status: She is alert.         Assessment & Plan  Routine general medical examination at health care facility    Orders:    1 Year Follow Up In Primary Care - Wellness Exam    1 Year Follow Up In Primary Care - Wellness Exam; Future    Mixed hyperlipidemia    Orders:    Lipid Panel    Primary hypertension    Orders:    Lipid Panel    Comprehensive Metabolic Panel    CBC    Morbid " obesity (Multi)         BMI 40.0-44.9, adult (Multi)         Trial Metamucil   Fasting labs ordered  Declines Influenza vaccination  Has had her COVID-19 booster  Shingrix vaccination discussed  Tdap booster recommended

## 2024-12-10 NOTE — ASSESSMENT & PLAN NOTE
Orders:    1 Year Follow Up In Primary Care - Wellness Exam    1 Year Follow Up In Primary Care - Wellness Exam; Future

## 2024-12-12 ASSESSMENT — ENCOUNTER SYMPTOMS
ACTIVITY CHANGE: 0
SHORTNESS OF BREATH: 0
DIARRHEA: 1
CONSTIPATION: 1

## 2024-12-16 NOTE — PROGRESS NOTES
12/18/2024 CC: 69 y.o. presents for glaucoma FU, Dr Ricci    Past ocular history: Glaucoma suspect  Family history: no family history of glaucoma   Past medical history: HTN, others per chart   Social history: former smoker (> 2 yrs)    Eye medications:   Both eyes: none    Allergy:  As per chart     Testing:     none    Assessment:    Glaucoma suspect of both eyes.  -  Pathophysiology of glaucoma, and potential blinding nature of disease reviewed.      Importance of follow up and compliance stressed  -patient is a glaucoma suspect based on nerves  -not currently on therapy  -hx of brimonidine and dorzolamide intollerances   -IOP is normal  -no definitive evidenc eof glaucomatous optic neuroppathy  Ok to monitor off therapy  Of note patietn does have occludable angles  Ros for acute angle closure is negative, to present immediately if they occur  R/b/a to LPI reviewed with patient  She wishes to proceed, will schedule OD then OS then fu  -s/p LPI OD 9/9/2024, OS 10/11/2024  -12/18/2024: IOP 17/19, Patent LPI OU. Mild dryness.      Plan:   I explained my findings. MARLENA GEE patent LPI OU    Eye medications:   Both eyes: Start Ats PRN    Return in 6 mo, Dr Ricci OCT/HVF

## 2024-12-18 ENCOUNTER — APPOINTMENT (OUTPATIENT)
Dept: OPHTHALMOLOGY | Facility: CLINIC | Age: 69
End: 2024-12-18
Payer: MEDICARE

## 2024-12-18 DIAGNOSIS — H40.039 ANATOMICAL NARROW ANGLE: ICD-10-CM

## 2024-12-18 DIAGNOSIS — H40.003 GLAUCOMA SUSPECT OF BOTH EYES: Primary | ICD-10-CM

## 2024-12-18 PROCEDURE — 99212 OFFICE O/P EST SF 10 MIN: CPT | Performed by: OPHTHALMOLOGY

## 2024-12-18 ASSESSMENT — VISUAL ACUITY
OS_CC+: -2
METHOD: SNELLEN - LINEAR
OS_SC: 20/125
OD_SC: 20/200
OD_CC: 20/30
CORRECTION_TYPE: GLASSES
OS_CC: 20/25
OS_SC+: -1

## 2024-12-18 ASSESSMENT — REFRACTION_WEARINGRX
OD_AXIS: 076
OS_ADD: +3.00
OS_AXIS: 082
SPECS_TYPE: PAL
OS_CYLINDER: -1.25
OD_ADD: +3.00
OD_CYLINDER: -1.00
OS_SPHERE: -5.00
OD_SPHERE: -3.75

## 2024-12-18 ASSESSMENT — CONF VISUAL FIELD
OS_INFERIOR_TEMPORAL_RESTRICTION: 0
OD_INFERIOR_TEMPORAL_RESTRICTION: 0
OS_SUPERIOR_TEMPORAL_RESTRICTION: 0
OS_SUPERIOR_NASAL_RESTRICTION: 0
OS_NORMAL: 1
OD_SUPERIOR_TEMPORAL_RESTRICTION: 0
OS_INFERIOR_NASAL_RESTRICTION: 0
METHOD: COUNTING FINGERS
OD_SUPERIOR_NASAL_RESTRICTION: 0
OD_INFERIOR_NASAL_RESTRICTION: 0
OD_NORMAL: 1

## 2024-12-18 ASSESSMENT — EXTERNAL EXAM - RIGHT EYE: OD_EXAM: NORMAL

## 2024-12-18 ASSESSMENT — TONOMETRY
IOP_METHOD: GOLDMANN APPLANATION
OS_IOP_MMHG: 19
OD_IOP_MMHG: 17

## 2024-12-18 ASSESSMENT — EXTERNAL EXAM - LEFT EYE: OS_EXAM: NORMAL

## 2024-12-18 ASSESSMENT — SLIT LAMP EXAM - LIDS
COMMENTS: NORMAL
COMMENTS: NORMAL

## 2024-12-18 ASSESSMENT — PACHYMETRY
OS_CT(UM): 570
OD_CT(UM): 555

## 2024-12-18 ASSESSMENT — ENCOUNTER SYMPTOMS: EYES NEGATIVE: 1

## 2025-06-10 ENCOUNTER — APPOINTMENT (OUTPATIENT)
Dept: PRIMARY CARE | Facility: CLINIC | Age: 70
End: 2025-06-10
Payer: MEDICARE

## 2025-06-10 VITALS
SYSTOLIC BLOOD PRESSURE: 126 MMHG | BODY MASS INDEX: 42.41 KG/M2 | TEMPERATURE: 96.5 F | DIASTOLIC BLOOD PRESSURE: 70 MMHG | WEIGHT: 210 LBS

## 2025-06-10 DIAGNOSIS — R73.9 HYPERGLYCEMIA: ICD-10-CM

## 2025-06-10 DIAGNOSIS — E66.01 MORBID (SEVERE) OBESITY DUE TO EXCESS CALORIES (MULTI): ICD-10-CM

## 2025-06-10 DIAGNOSIS — I10 PRIMARY HYPERTENSION: ICD-10-CM

## 2025-06-10 DIAGNOSIS — E78.2 MIXED HYPERLIPIDEMIA: ICD-10-CM

## 2025-06-10 LAB — POC FINGERSTICK BLOOD GLUCOSE: 115 MG/DL (ref 70–100)

## 2025-06-10 PROCEDURE — 3074F SYST BP LT 130 MM HG: CPT | Performed by: FAMILY MEDICINE

## 2025-06-10 PROCEDURE — 82962 GLUCOSE BLOOD TEST: CPT | Performed by: FAMILY MEDICINE

## 2025-06-10 PROCEDURE — 1159F MED LIST DOCD IN RCRD: CPT | Performed by: FAMILY MEDICINE

## 2025-06-10 PROCEDURE — 1036F TOBACCO NON-USER: CPT | Performed by: FAMILY MEDICINE

## 2025-06-10 PROCEDURE — 1160F RVW MEDS BY RX/DR IN RCRD: CPT | Performed by: FAMILY MEDICINE

## 2025-06-10 PROCEDURE — G2211 COMPLEX E/M VISIT ADD ON: HCPCS | Performed by: FAMILY MEDICINE

## 2025-06-10 PROCEDURE — 3078F DIAST BP <80 MM HG: CPT | Performed by: FAMILY MEDICINE

## 2025-06-10 PROCEDURE — 99214 OFFICE O/P EST MOD 30 MIN: CPT | Performed by: FAMILY MEDICINE

## 2025-06-10 RX ORDER — ATORVASTATIN CALCIUM 20 MG/1
20 TABLET, FILM COATED ORAL DAILY
Qty: 90 TABLET | Refills: 3 | Status: SHIPPED | OUTPATIENT
Start: 2025-06-10

## 2025-06-10 RX ORDER — HYDROCHLOROTHIAZIDE 25 MG/1
25 TABLET ORAL DAILY
Qty: 90 TABLET | Refills: 3 | Status: SHIPPED | OUTPATIENT
Start: 2025-06-10

## 2025-06-10 RX ORDER — ENALAPRIL MALEATE 20 MG/1
20 TABLET ORAL DAILY
Qty: 90 TABLET | Refills: 3 | Status: SHIPPED | OUTPATIENT
Start: 2025-06-10

## 2025-06-10 NOTE — PROGRESS NOTES
Chief complaint:   Chief Complaint   Patient presents with    6 month follow up       HPI:  Catherine Lemus is a 69 y.o. female who presents for evaluation of her chronic conditions. No acute concerns today.     Physical exam:  /70 (BP Location: Left arm, Patient Position: Sitting)   Temp 35.8 °C (96.5 °F)   Wt 95.3 kg (210 lb)   LMP  (LMP Unknown)   BMI 42.41 kg/m²   General: NAD, well appearing female  Heart: RRR, no mumur appreciated  Lungs: CTAB, no wheezes, rales, rhonchi  Abdomen: soft, non tender, normoactive BS, no organomegaly  Extremities: No LE edema    Assessment/Plan   Problem List Items Addressed This Visit       HLD (hyperlipidemia)    Relevant Medications    atorvastatin (Lipitor) 20 mg tablet    Other Relevant Orders    Comprehensive Metabolic Panel    Hypertension    Relevant Medications    enalapril (Vasotec) 20 mg tablet    hydroCHLOROthiazide (HYDRODiuril) 25 mg tablet    Other Relevant Orders    Lipid Panel    Comprehensive Metabolic Panel    CBC and Auto Differential     Other Visit Diagnoses         Hyperglycemia        Relevant Orders    POCT Fingerstick Glucose manually resulted (Completed)      BMI 40.0-44.9, adult (Multi)          Morbid (severe) obesity due to excess calories (Multi)            Medications refilled as above  Fasting labs ordered  Continue working on weight loss through healthy diet and exercise habits  Follow up 6 mo, sooner as needed    Mulu Augustine,

## 2025-06-20 ENCOUNTER — APPOINTMENT (OUTPATIENT)
Dept: PRIMARY CARE | Facility: CLINIC | Age: 70
End: 2025-06-20
Payer: OTHER GOVERNMENT

## 2025-06-24 ENCOUNTER — APPOINTMENT (OUTPATIENT)
Dept: OPHTHALMOLOGY | Facility: CLINIC | Age: 70
End: 2025-06-24
Payer: MEDICARE

## 2025-06-24 DIAGNOSIS — H40.003 GLAUCOMA SUSPECT OF BOTH EYES: Primary | ICD-10-CM

## 2025-06-24 DIAGNOSIS — H40.039 ANATOMICAL NARROW ANGLE: ICD-10-CM

## 2025-06-24 PROCEDURE — 92020 GONIOSCOPY: CPT | Performed by: OPHTHALMOLOGY

## 2025-06-24 PROCEDURE — 99213 OFFICE O/P EST LOW 20 MIN: CPT | Performed by: OPHTHALMOLOGY

## 2025-06-24 ASSESSMENT — REFRACTION_WEARINGRX
OS_AXIS: 082
OD_CYLINDER: -1.00
OS_CYLINDER: -1.25
OD_ADD: +3.00
OD_SPHERE: -3.75
OS_ADD: +3.00
OD_AXIS: 076
SPECS_TYPE: PAL
OS_SPHERE: -5.00

## 2025-06-24 ASSESSMENT — TONOMETRY
IOP_METHOD: GOLDMANN APPLANATION
OD_IOP_MMHG: 19
OS_IOP_MMHG: 18

## 2025-06-24 ASSESSMENT — ENCOUNTER SYMPTOMS
HEMATOLOGIC/LYMPHATIC NEGATIVE: 0
NEUROLOGICAL NEGATIVE: 0
CARDIOVASCULAR NEGATIVE: 0
ALLERGIC/IMMUNOLOGIC NEGATIVE: 0
EYES NEGATIVE: 1
CONSTITUTIONAL NEGATIVE: 0
RESPIRATORY NEGATIVE: 0
MUSCULOSKELETAL NEGATIVE: 0
ENDOCRINE NEGATIVE: 0
GASTROINTESTINAL NEGATIVE: 0
PSYCHIATRIC NEGATIVE: 0

## 2025-06-24 ASSESSMENT — VISUAL ACUITY
CORRECTION_TYPE: GLASSES
OS_CC: 20/30
METHOD: SNELLEN - LINEAR
OD_CC: 20/30-1

## 2025-06-24 ASSESSMENT — CONF VISUAL FIELD
OS_SUPERIOR_NASAL_RESTRICTION: 0
OS_NORMAL: 1
OD_SUPERIOR_TEMPORAL_RESTRICTION: 0
OD_NORMAL: 1
OD_INFERIOR_TEMPORAL_RESTRICTION: 0
OS_SUPERIOR_TEMPORAL_RESTRICTION: 0
OD_SUPERIOR_NASAL_RESTRICTION: 0
OS_INFERIOR_NASAL_RESTRICTION: 0
OS_INFERIOR_TEMPORAL_RESTRICTION: 0
OD_INFERIOR_NASAL_RESTRICTION: 0
METHOD: COUNTING FINGERS

## 2025-06-24 ASSESSMENT — SLIT LAMP EXAM - LIDS
COMMENTS: NORMAL
COMMENTS: NORMAL

## 2025-06-24 ASSESSMENT — GONIOSCOPY
OD_NASAL: C30F 2+
OS_NASAL: C30F 2+
OS_SUPERIOR: C30F 2+
OD_SUPERIOR: C30F 2+
OS_TEMPORAL: C30F 2+
OD_TEMPORAL: C30F 2+
OS_INFERIOR: C30F 2+
OD_INFERIOR: C30F 2+

## 2025-06-24 ASSESSMENT — EXTERNAL EXAM - LEFT EYE: OS_EXAM: NORMAL

## 2025-06-24 ASSESSMENT — PACHYMETRY
OS_CT(UM): 570
OD_CT(UM): 555

## 2025-06-24 ASSESSMENT — EXTERNAL EXAM - RIGHT EYE: OD_EXAM: NORMAL

## 2025-06-24 NOTE — PROGRESS NOTES
History    Chief Complaint    Glaucoma       HPI       Glaucoma    In both eyes.             Comments    69yoF here for 6 month F/U for glaucoma suspect OU, s/p LPI OU, patient not on any glaucoma drops, c/o harder time reading street signs and seeing on computer since LV, no floaters, no flashes, no itchiness, no burning, no pain, no tearing, no discharge,           Last edited by JESSY Florentino on 6/24/2025 10:13 AM.        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) (vermilion border)    Breast lump    Cervical lymphadenopathy    Colitis    Colonic fistula    Dysphagia    Atopic dermatitis    Eczema    Glaucoma    Positive colorectal cancer screening using Cologuard test    HLD (hyperlipidemia)    Hypertension    Irritation of external female genitalia    Leucocytosis    Neck mass    Obesity, morbid (Multi)    Sialadenitis    Sialolith    Stress incontinence, female    Thyroid nodule    Tubular adenoma of colon    Urinary frequency    Nocturia    Diverticulitis of colon    Routine general medical examination at health care facility       Past Medical History:   Diagnosis Date    Acute lower UTI (urinary tract infection) 06/22/2023    Cataract     Crohn's disease of large intestine without complications (Multi) 12/20/2017    Segmental colitis    Cutaneous abscess, unspecified 10/08/2019    Abscess    Encounter for screening for malignant neoplasm of colon 06/17/2022    Colon cancer screening    Fistula of intestine 09/14/2022    Colonic fistula    Glaucoma     Other conditions influencing health status 08/18/2022    Colonoscopy planned    Other fecal abnormalities 09/07/2022    Positive colorectal cancer screening using Cologuard test    Other hypertrophic disorders of the skin 07/31/2019    Acquired skin tag    Personal history of colonic polyps 09/17/2022    History of adenomatous polyp  of colon    Personal history of other benign neoplasm 12/20/2017    History of other benign neoplasm    Personal history of other diseases of the digestive system 09/21/2022    History of colitis    Personal history of other endocrine, nutritional and metabolic disease 09/03/2020    History of thyroid nodule    Renal mass 06/22/2023    Sialoadenitis, unspecified 09/03/2020    Sialadenitis    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 Ricci    OTHER SURGICAL HISTORY  11/29/2022    Sigmoidectomy     SOCIAL HISTORY   SMOKING:  reports that she quit smoking about 3 years ago. Her smoking use included cigarettes. She has never been exposed to tobacco smoke. She has never used smokeless tobacco.  DRUG USE:    reports no history of drug use.    FAMILY HISTORY  family history includes Arthritis in her mother; COPD in her father; CVA in her mother; Hypertension in her mother; congestive cardiomyopathy in her father; lung mass in her father.    CURRENT MEDICATIONS  No current outpatient medications on file. (Ophthalmology pharm classes)       Current Outpatient Medications (Other)   Medication Sig Dispense Refill    atorvastatin (Lipitor) 20 mg tablet Take 1 tablet (20 mg) by mouth once daily. 90 tablet 3    clotrimazole-betamethasone (Lotrisone) cream Apply 1 Application topically 2 times a day.      enalapril (Vasotec) 20 mg tablet Take 1 tablet (20 mg) by mouth once daily. 90 tablet 3    hydroCHLOROthiazide (HYDRODiuril) 25 mg tablet Take 1 tablet (25 mg) by mouth once daily. 90 tablet 3    psyllium (Metamucil) powder Take 1 Dose (5.8 g) by mouth once daily.         Exam   Visual Acuity  (Snellen - Linear)         Right Left    Dist cc 20/30-1 20/30      Correction: Glasses              Edited by: Leann Noel, COT              Wearing Rx       Wearing Rx         Sphere Cylinder Axis Add    Right -3.75 -1.00 076 +3.00    Left -5.00 -1.25 082 +3.00      Type: PAL                  Not recorded       Pupils       Pupils         Pupils    Right PERRL, No APD    Left PERRL, No APD                  Intraocular pressure was 16 in the right eye and 15 in the left eye using Goldmann Applanation.  Extraocular Movement       Extraocular Movement         Right Left     Full Full                  Not recorded       Not recorded             Diagnostics              Plan   -  There were no encounter diagnoses.  -  Referred By:  No ref. provider found  -  IOP:  Last Tonometry OD 16 / OS 15 /Date 10:21 AM      Target OD: No Value exists for the : EPIC#DDO219 Target OS: No Value exists for the : EPIC#XTV716       Max pressure OD:   Date:         Max Pressure OS:   Date:    -    Not recorded         -    Not recorded       -  Pathophysiology of glaucoma, and potential blinding nature of disease reviewed.      Importance of follow up and compliance stressed  -patient is a glaucoma suspect based on nerves  -not currently on therapy  -hx of brimonidine and dorzolamide intollerances   -IOP is normal  -no definitive evidenc eof glaucomatous optic neuroppathy  -s/p LPI OU, gonio now deepened    Monitor off therapy  Rtc 6 months for cataract eval+dfe/oct rnfl

## 2025-12-16 ENCOUNTER — APPOINTMENT (OUTPATIENT)
Dept: PRIMARY CARE | Facility: CLINIC | Age: 70
End: 2025-12-16
Payer: MEDICARE

## 2025-12-23 ENCOUNTER — APPOINTMENT (OUTPATIENT)
Dept: OPHTHALMOLOGY | Facility: CLINIC | Age: 70
End: 2025-12-23
Payer: MEDICARE

## 2026-01-13 ENCOUNTER — APPOINTMENT (OUTPATIENT)
Dept: PRIMARY CARE | Facility: CLINIC | Age: 71
End: 2026-01-13
Payer: MEDICARE